# Patient Record
Sex: FEMALE | Race: WHITE | NOT HISPANIC OR LATINO | Employment: OTHER | URBAN - METROPOLITAN AREA
[De-identification: names, ages, dates, MRNs, and addresses within clinical notes are randomized per-mention and may not be internally consistent; named-entity substitution may affect disease eponyms.]

---

## 2017-06-27 ENCOUNTER — OFFICE VISIT (OUTPATIENT)
Dept: NEUROLOGY | Facility: CLINIC | Age: 62
End: 2017-06-27

## 2017-06-27 VITALS
SYSTOLIC BLOOD PRESSURE: 125 MMHG | WEIGHT: 150 LBS | HEIGHT: 60 IN | BODY MASS INDEX: 29.45 KG/M2 | DIASTOLIC BLOOD PRESSURE: 84 MMHG

## 2017-06-27 DIAGNOSIS — G45.9 TRANSIENT CEREBRAL ISCHEMIA, UNSPECIFIED TYPE: Primary | ICD-10-CM

## 2017-06-27 PROCEDURE — 99204 OFFICE O/P NEW MOD 45 MIN: CPT | Performed by: PSYCHIATRY & NEUROLOGY

## 2017-06-27 RX ORDER — ATORVASTATIN CALCIUM 20 MG/1
20 TABLET, FILM COATED ORAL DAILY
COMMUNITY

## 2017-06-27 RX ORDER — DIPHENHYDRAMINE HYDROCHLORIDE 25 MG/1
TABLET ORAL
COMMUNITY
End: 2021-02-05

## 2017-06-27 RX ORDER — ASPIRIN 81 MG/1
81 TABLET ORAL DAILY
COMMUNITY

## 2017-06-27 RX ORDER — PROPRANOLOL HYDROCHLORIDE 120 MG/1
120 CAPSULE, EXTENDED RELEASE ORAL DAILY
COMMUNITY

## 2017-06-27 RX ORDER — AMLODIPINE BESYLATE AND BENAZEPRIL HYDROCHLORIDE 5; 40 MG/1; MG/1
1 CAPSULE ORAL DAILY
COMMUNITY
End: 2021-02-05

## 2017-06-27 RX ORDER — LEVOTHYROXINE SODIUM 0.07 MG/1
75 TABLET ORAL DAILY
COMMUNITY

## 2017-06-27 NOTE — PROGRESS NOTES
"Subjective   Irene Granados is a 62 y.o. female.     History of Present Illness   61 yo LH woman with HTN, pre-DM, with episode 2 months ago while watching TV, lower right side of her face drooped (no eye closure or vision changes) for about an hour, and felt a little numb, no slurred speech. No limb sx. Next day mandible still slightly numb. No accompanying sx, including headache. No recurrence. Saw Dr Johansen several weeks later.  No palpitations or chest pain.  Purposely lost 30lb, increased exercise and changed diet last year after dx with pre-DM.  Recent A1C 5.9, fasting glucose 109, , TChol 209, TSH normal.  Had CT of head, echo and carotid duplex. Had PFO, closure device placed by Dr Madrid 6 or 7 years ago. Found due to migraines, procedure done for that reason, and only 3 migraines since (were 1/week).   Started aspirin and statin after episode 2 mos ago.   Mother may have had \"mini-strokes\" but  of cancer.   To see cardiologist next week.    The following portions of the patient's history were reviewed and updated as appropriate: allergies, current medications, past family history, past medical history, past social history, past surgical history and problem list.    Review of Systems   Constitutional: Negative.    HENT: Negative.    Eyes: Negative.    Respiratory: Negative.    Cardiovascular: Negative.    Gastrointestinal: Negative.    Endocrine: Negative.    Genitourinary: Negative.    Musculoskeletal: Negative.    Skin: Negative.    Allergic/Immunologic: Negative.    Neurological:        As in hpi   Hematological: Negative.    Psychiatric/Behavioral: Negative.        Objective   Physical Exam   Constitutional: She is oriented to person, place, and time. She appears well-developed and well-nourished.   Eyes: EOM are normal. Pupils are equal, round, and reactive to light.   Neck: Normal range of motion. Neck supple.   Cardiovascular: Normal rate, regular rhythm and normal heart sounds.    No " carotid bruit   Pulmonary/Chest: Effort normal and breath sounds normal.   Musculoskeletal: Normal range of motion.   Neurological: She is alert and oriented to person, place, and time. She has a normal Finger-Nose-Finger Test and a normal Heel to Shin Test. Gait normal.   Skin: Skin is warm and dry.   Psychiatric: She has a normal mood and affect. Her speech is normal and behavior is normal. Thought content normal.       Neurologic Exam     Mental Status   Oriented to person, place, and time.   Attention: normal. Concentration: normal.   Speech: speech is normal   Level of consciousness: alert  Knowledge: consistent with education.   Able to name object. Able to repeat. Able to write. Normal comprehension.        Remote and recent memory intact     Cranial Nerves     CN II   Visual fields full to confrontation.     CN III, IV, VI   Pupils are equal, round, and reactive to light.  Extraocular motions are normal.   Right pupil: Reactivity: brisk. Consensual response: intact. Accommodation: intact.   Left pupil: Reactivity: brisk. Consensual response: intact. Accommodation: intact.   CN III: no CN III palsy  CN VI: no CN VI palsy  Nystagmus: none   Diplopia: none  Ophthalmoparesis: none  Upgaze: normal  Downgaze: normal  Conjugate gaze: present    CN V   Facial sensation intact.     CN VII   Facial expression full, symmetric.   Right facial weakness: none  Left facial weakness: none    CN VIII   CN VIII normal.   Hearing: intact    CN IX, X   CN IX normal.   Palate: symmetric    CN XI   CN XI normal.   Right sternocleidomastoid strength: normal  Left sternocleidomastoid strength: normal  Right trapezius strength: normal  Left trapezius strength: normal    CN XII   CN XII normal.   Tongue: not atrophic  Fasciculations: absent  Tongue deviation: none       Optic discs benign     Motor Exam   Muscle bulk: normal  Overall muscle tone: normal  Right arm pronator drift: absent  Left arm pronator drift: absent    Strength    Strength 5/5 except as noted.     Sensory Exam   Light touch normal.   Vibration normal.   Proprioception normal.     Gait, Coordination, and Reflexes     Gait  Gait: normal    Coordination   Finger to nose coordination: normal  Heel to shin coordination: normal    Tremor   Resting tremor: absent  Intention tremor: absent  Action tremor: absent    Reflexes   Reflexes 2+ except as noted.   Right ankle clonus: absent      Assessment/Plan   Irene was seen today for transient ischemic attack.    Diagnoses and all orders for this visit:    Transient cerebral ischemia, unspecified type    Discussion/Summary:  Agree with dx and all testing to date. Motor sx more suggestive of ischemia than complex migraine. Will obtain MRI, r/o additional subclinical strokes in >1 vascular territory that could suggest embolic source, although less likely based on testing to date. Will arrange f/u pending result. Discussed goal LDL <100, discussed diet, exercise, etc.  Return if symptoms worsen or fail to improve.

## 2017-07-06 ENCOUNTER — HOSPITAL ENCOUNTER (OUTPATIENT)
Dept: MRI IMAGING | Facility: HOSPITAL | Age: 62
Discharge: HOME | End: 2017-07-06
Attending: PSYCHIATRY & NEUROLOGY | Admitting: PSYCHIATRY & NEUROLOGY

## 2017-07-06 PROCEDURE — 70551 MRI BRAIN STEM W/O DYE: CPT

## 2020-01-15 ENCOUNTER — TRANSCRIBE ORDERS (OUTPATIENT)
Dept: DIABETES SERVICES | Facility: HOSPITAL | Age: 65
End: 2020-01-15

## 2020-01-15 DIAGNOSIS — R73.03 PREDIABETES: Primary | ICD-10-CM

## 2020-02-18 ENCOUNTER — HOSPITAL ENCOUNTER (OUTPATIENT)
Dept: DIABETES SERVICES | Facility: HOSPITAL | Age: 65
Setting detail: RECURRING SERIES
Discharge: HOME | End: 2020-02-18

## 2020-03-19 ENCOUNTER — APPOINTMENT (OUTPATIENT)
Dept: DIABETES SERVICES | Facility: HOSPITAL | Age: 65
End: 2020-03-19

## 2021-02-05 ENCOUNTER — OFFICE VISIT (OUTPATIENT)
Dept: CARDIOLOGY | Facility: CLINIC | Age: 66
End: 2021-02-05

## 2021-02-05 VITALS
RESPIRATION RATE: 12 BRPM | OXYGEN SATURATION: 99 % | HEIGHT: 60 IN | BODY MASS INDEX: 24.54 KG/M2 | HEART RATE: 60 BPM | WEIGHT: 125 LBS | DIASTOLIC BLOOD PRESSURE: 82 MMHG | SYSTOLIC BLOOD PRESSURE: 125 MMHG

## 2021-02-05 DIAGNOSIS — I95.1 AUTONOMIC ORTHOSTATIC HYPOTENSION: Primary | ICD-10-CM

## 2021-02-05 DIAGNOSIS — E78.5 HYPERLIPIDEMIA LDL GOAL <100: ICD-10-CM

## 2021-02-05 DIAGNOSIS — I10 ESSENTIAL HYPERTENSION: ICD-10-CM

## 2021-02-05 PROCEDURE — 99443 PR PHYS/QHP TELEPHONE EVALUATION 21-30 MIN: CPT | Performed by: INTERNAL MEDICINE

## 2021-02-05 NOTE — PROGRESS NOTES
[facility]  [facility]  [address] [place] [address]  Dept: [phone]  Dept Fax: [phone]    Irene Granados  1955    Televisit Note    You have chosen to receive care through a telephone visit. Do you consent to use a telephone visit for your medical care today? Yes    History of Present Illness:  Irene Granados is a 65 y.o. female who presents via phone for Follow-up. Orthostatic Hypotension- better after she is off Lotrel, only Inderal,     The following portions of the patient's history were reviewed and updated as appropriate: allergies, current medications, past family history, past medical history, past social history, past surgical history and problem list.    This visit was scheduled as a phone visit to comply with patient safety concerns in accordance with CDC recommendations.  Time spent in discussion with the patient was 30 minutes.     Medications  aspirin  atorvastatin  cyclobenzaprine  levothyroxine  multivitamin with minerals tablet  propranolol LA    Subjective  Allergies   Allergen Reactions   • Penicillins Hives   • Codeine Nausea Only   • Morphine Nausea Only        Past Medical History:   Diagnosis Date   • Acquired hypothyroidism    • Colon polyps    • Deviated septum     Repaired and Polyps removed 2000   • Hyperglycemia    • Hypertension     Additional Information: For the last 6 mnths, has oticed dizziness standing, her BP seems dropps after standing BP is 115/73 sitting and after standing 94.66, she will come tomorrow to check her, Dr. Johansen few months ago lower the lotrel to 520, she takes Inderal xl 120   • Migraine    • Patent foramen ovale    • Sleep apnea    • TIA (transient ischemic attack)        Past Surgical History:   Procedure Laterality Date   • CHOLECYSTECTOMY     • COLONOSCOPY  2016    Done 2016 tics--no polyps this time   • DILATATION AND CURETTAGE     • HERNIA REPAIR     • SINUS SURGERY     • WISDOM TOOTH EXTRACTION    "      Family History   Problem Relation Age of Onset   • Cancer Mother    • Hypertension Mother    • Multiple myeloma Mother    • Hypothyroidism Mother    • Cancer Father    • Dementia Father    • Hypertension Father    • Diabetes Father         Social History     Socioeconomic History   • Marital status:      Spouse name: Not on file   • Number of children: Not on file   • Years of education: Not on file   • Highest education level: Not on file   Tobacco Use   • Smoking status: Former Smoker     Packs/day: 1.00     Types: Cigarettes     Start date:      Quit date: 1984     Years since quittin.1   • Smokeless tobacco: Never Used   Substance and Sexual Activity   • Alcohol use: Yes     Alcohol/week: 2.0 standard drinks     Types: 1 Glasses of wine, 1 Cans of beer per week     Comment: 1-2 drinks per week   • Drug use: Never   • Sexual activity: Defer       Cardiovascular Procedures    ECHO/MUGA:   STRESS TESTS:   CARDIAC CATH:   DEVICES:   HOLTER:   CT/MRI:   VASCULAR:   CARDIOTHORACIC:     Objective  Vitals:    21 1327   BP: 125/82  Comment: atn home  113/76 standing   BP Location: Left arm   Patient Position: Sitting   Cuff Size: Adult   Pulse: 60   Resp: 12   SpO2: 99%   Weight: 56.7 kg (125 lb)   Height: 152.4 cm (60\")   PainSc: 0-No pain     Body mass index is 24.41 kg/m².    Assessment and Plan  Diagnoses and all orders for this visit:    Autonomic orthostatic hypotension- Doing better, drinking plenty fluids, no major complaints    Essential hypertension-only on inderal    Hyperlipidemia LDL goal <100- On Lipitor 20 mg        No follow-ups on file.    Shawn Avilez MD  2021  "

## 2021-04-26 ENCOUNTER — APPOINTMENT (OUTPATIENT)
Dept: WOMENS IMAGING | Facility: HOSPITAL | Age: 66
End: 2021-04-26

## 2021-04-26 PROCEDURE — 77067 SCR MAMMO BI INCL CAD: CPT | Performed by: RADIOLOGY

## 2023-07-31 ENCOUNTER — TELEPHONE (OUTPATIENT)
Dept: FAMILY MEDICINE CLINIC | Facility: CLINIC | Age: 68
End: 2023-07-31

## 2023-07-31 NOTE — TELEPHONE ENCOUNTER
Caller: David Granados    Relationship: Self    Best call back number: [phone]     What is the best time to reach you: ANYTIME     Who are you requesting to speak with (clinical staff, provider,  specific staff member): CLINICAL     Do you know the name of the person who called: THE PATIENT DAVID     What was the call regarding: THE PATIENT IS REQUESTING A CALL BACK TO FIND OUT WHERE SHE HAD HER MOST RECENT MAMMOGRAM AND BONE DENSITY TESTS DONE AT, ABOUT 2 YEARS AGO.     Is it okay if the provider responds through [facility]: NO, PLEASE CALL AND ADVISE